# Patient Record
Sex: FEMALE | Race: WHITE | NOT HISPANIC OR LATINO | URBAN - METROPOLITAN AREA
[De-identification: names, ages, dates, MRNs, and addresses within clinical notes are randomized per-mention and may not be internally consistent; named-entity substitution may affect disease eponyms.]

---

## 2021-01-03 ENCOUNTER — EMERGENCY (EMERGENCY)
Facility: HOSPITAL | Age: 41
LOS: 1 days | Discharge: HOME | End: 2021-01-03
Attending: STUDENT IN AN ORGANIZED HEALTH CARE EDUCATION/TRAINING PROGRAM
Payer: MEDICARE

## 2021-01-03 VITALS
OXYGEN SATURATION: 97 % | TEMPERATURE: 97 F | DIASTOLIC BLOOD PRESSURE: 64 MMHG | WEIGHT: 293 LBS | HEART RATE: 85 BPM | RESPIRATION RATE: 18 BRPM | HEIGHT: 62 IN | SYSTOLIC BLOOD PRESSURE: 122 MMHG

## 2021-01-03 PROCEDURE — U0003: CPT

## 2021-01-03 PROCEDURE — U0005: CPT

## 2021-01-03 PROCEDURE — 93010 ELECTROCARDIOGRAM REPORT: CPT

## 2021-01-03 PROCEDURE — 93005 ELECTROCARDIOGRAM TRACING: CPT

## 2021-01-03 PROCEDURE — 99284 EMERGENCY DEPT VISIT MOD MDM: CPT

## 2021-01-03 PROCEDURE — 99283 EMERGENCY DEPT VISIT LOW MDM: CPT

## 2021-01-03 RX ORDER — IBUPROFEN 200 MG
1 TABLET ORAL
Qty: 20 | Refills: 0
Start: 2021-01-03 | End: 2021-01-07

## 2021-01-03 RX ORDER — ACETAMINOPHEN 500 MG
3 TABLET ORAL
Qty: 60 | Refills: 0
Start: 2021-01-03 | End: 2021-01-07

## 2021-01-03 NOTE — ED PROVIDER NOTE - OBJECTIVE STATEMENT
39 y/o F pt with no significant PMHx presents to the ED after contact with a COVID+ individual. Mild sx. No CP. No further complaints at this time. 39 y/o F pt with no significant PMHx presents to the ED after contact with a COVID+ individual. Mild sx. No CP. No further complaints at this time. Pt states that she had gradual onset of symptoms that started X3 days ago spontaneously W significant sick contact. PT states that they have been having diffuse myalgia, non productive cough, and mild SOB that have not limited her ability to perform ADLs.   PT admits to subjective fevers, dines HA, weakness, LOC, Vomiting, urinary symptoms.

## 2021-01-03 NOTE — ED PROVIDER NOTE - ATTENDING CONTRIBUTION TO CARE
41 yo female presenting for evaluation secondary to recent Covid exposure and now has mild respiratory symptoms. I personally saw the patient with the PA, and completed the key components of the history and physical exam. I then discussed the management plan with the PA.

## 2021-01-03 NOTE — ED PROVIDER NOTE - ADDITIONAL NOTES AND INSTRUCTIONS:
PT was evaluated At [facility] ED and was found to have a condition that warranted time of to rest and heal from WORK/SCHOOL.   Nael Coleman PA-C

## 2021-01-03 NOTE — ED PROVIDER NOTE - CLINICAL SUMMARY MEDICAL DECISION MAKING FREE TEXT BOX
Pt with stable VS, tolerating PO in the , non toxic appearing interacting with staff and family appropriately,  PT with normal PE, non hypoxic,  SOB not limiting ADLs PT will be dc home with follow up to PCP, good supportive care, educated about proper use of antipyretics, good hydrations, educated about self isolation educated about when to return to the ED if needed. educated about when to return to the ED if needed. PT verbalizes that he understands all instructions and results. Pt infomred that ED is open and availible 24/7 365 days a yr, encouraged to return to the ED if they have any change in condition, or feel the need for revaluation.    utilized to obtain History, ROS, Physical Exam, explanations of results and plan of care, as well as follow up instructions. [facility]  #[id number]

## 2021-01-03 NOTE — ED PROVIDER NOTE - PATIENT PORTAL LINK FT
You can access the FollowMyHealth Patient Portal offered by [facility] by registering at the following website: [email or web address]. By joining [facility]’s FollowMyHealth portal, you will also be able to view your health information using other applications (apps) compatible with our system.

## 2021-01-03 NOTE — ED PROVIDER NOTE - RESPIRATORY, MLM
Breath sounds clear and equal bilaterally. NO RESP DISTRESS, breathing unlabored, LUNGS CTA B/L, RR 19, SPO2 98%, with activity 98%. no secondary muscle use.

## 2021-01-04 LAB — SARS-COV-2 RNA SPEC QL NAA+PROBE: DETECTED
